# Patient Record
Sex: FEMALE | URBAN - METROPOLITAN AREA
[De-identification: names, ages, dates, MRNs, and addresses within clinical notes are randomized per-mention and may not be internally consistent; named-entity substitution may affect disease eponyms.]

---

## 2024-04-17 ENCOUNTER — OV NP (OUTPATIENT)
Dept: URBAN - METROPOLITAN AREA CLINIC 7 | Facility: CLINIC | Age: 49
End: 2024-04-17
Payer: COMMERCIAL

## 2024-04-17 ENCOUNTER — LAB (OUTPATIENT)
Dept: URBAN - METROPOLITAN AREA CLINIC 7 | Facility: CLINIC | Age: 49
End: 2024-04-17

## 2024-04-17 VITALS
WEIGHT: 194 LBS | SYSTOLIC BLOOD PRESSURE: 124 MMHG | BODY MASS INDEX: 29.4 KG/M2 | HEIGHT: 68 IN | TEMPERATURE: 97.8 F | DIASTOLIC BLOOD PRESSURE: 82 MMHG

## 2024-04-17 DIAGNOSIS — K21.9 CHRONIC GERD: ICD-10-CM

## 2024-04-17 DIAGNOSIS — D50.0 IRON DEFICIENCY ANEMIA DUE TO CHRONIC BLOOD LOSS: ICD-10-CM

## 2024-04-17 DIAGNOSIS — M79.7 FIBROMYALGIA: ICD-10-CM

## 2024-04-17 DIAGNOSIS — R10.84 GENERALIZED ABDOMINAL PAIN: ICD-10-CM

## 2024-04-17 DIAGNOSIS — K76.0 HEPATIC STEATOSIS: ICD-10-CM

## 2024-04-17 DIAGNOSIS — R14.0 BLOATING: ICD-10-CM

## 2024-04-17 DIAGNOSIS — Z80.0 FAMILY HX OF COLON CANCER: ICD-10-CM

## 2024-04-17 DIAGNOSIS — K92.1 MELENA: ICD-10-CM

## 2024-04-17 DIAGNOSIS — K52.9 CHRONIC DIARRHEA: ICD-10-CM

## 2024-04-17 DIAGNOSIS — R68.81 EARLY SATIETY: ICD-10-CM

## 2024-04-17 PROBLEM — 235595009: Status: ACTIVE | Noted: 2024-04-17

## 2024-04-17 PROBLEM — 236071009: Status: ACTIVE | Noted: 2024-04-17

## 2024-04-17 PROBLEM — 2901004: Status: ACTIVE | Noted: 2024-04-17

## 2024-04-17 PROBLEM — 197321007: Status: ACTIVE | Noted: 2024-04-17

## 2024-04-17 PROBLEM — 203082005: Status: ACTIVE | Noted: 2024-04-17

## 2024-04-17 PROBLEM — 724556004: Status: ACTIVE | Noted: 2024-04-17

## 2024-04-17 PROBLEM — 102614006: Status: ACTIVE | Noted: 2024-04-17

## 2024-04-17 PROBLEM — 312824007: Status: ACTIVE | Noted: 2024-04-17

## 2024-04-17 PROBLEM — 116289008: Status: ACTIVE | Noted: 2024-04-17

## 2024-04-17 PROCEDURE — 99204 OFFICE O/P NEW MOD 45 MIN: CPT | Performed by: INTERNAL MEDICINE

## 2024-04-17 RX ORDER — CYCLOSPORINE 0.5 MG/ML
EMULSION OPHTHALMIC
Qty: 60 EACH | Status: ACTIVE | COMMUNITY

## 2024-04-17 NOTE — HPI-TODAY'S VISIT:
[facility] 2018 noted onset of gi sxs for which had negative gi eval . Since that time has had ongoing and recurrent GERD sxs with associated dyspepsia ,bloating and early satiety. Sxs worse postprandially ,worse with bending forward and in the supine position. Having recurrent erutucation as well as acid brash sxs. Also has a change in bowel habits with diarrhea that is recurrent. Symptoms are chronic BMs are gen watery and has had isolated mucoid stools but no recent bleeding. In 2022 had labs notable for iron defic anemia.Took po iron before but had difficulty tolerating . No overt bleeding now but did have melena sxs previously . No fevers or chills. No night sweats. No weight loss. No flushing. Intermittent  nausea  but no vomiting. No travel,sick contacts or new medications. No recent dietary changes. No excessive caffeine intake . No excessive artificial sweetener use including sorbitol and diabetic sweetened foods. No family history IBD Family history CRC in mother. Hx chronic back pain. PRN nsaid use but not regularly.

## 2024-05-01 ENCOUNTER — LAB OUTSIDE AN ENCOUNTER (OUTPATIENT)
Dept: URBAN - METROPOLITAN AREA CLINIC 7 | Facility: CLINIC | Age: 49
End: 2024-05-01

## 2024-05-06 ENCOUNTER — TELEPHONE ENCOUNTER (OUTPATIENT)
Dept: URBAN - METROPOLITAN AREA CLINIC 7 | Facility: CLINIC | Age: 49
End: 2024-05-06

## 2024-05-06 ENCOUNTER — LAB OUTSIDE AN ENCOUNTER (OUTPATIENT)
Dept: URBAN - METROPOLITAN AREA CLINIC 7 | Facility: CLINIC | Age: 49
End: 2024-05-06

## 2024-05-16 ENCOUNTER — TELEPHONE ENCOUNTER (OUTPATIENT)
Dept: URBAN - METROPOLITAN AREA CLINIC 7 | Facility: CLINIC | Age: 49
End: 2024-05-16

## 2024-05-16 ENCOUNTER — LAB OUTSIDE AN ENCOUNTER (OUTPATIENT)
Dept: URBAN - METROPOLITAN AREA CLINIC 7 | Facility: CLINIC | Age: 49
End: 2024-05-16

## 2024-05-30 ENCOUNTER — TELEPHONE ENCOUNTER (OUTPATIENT)
Dept: URBAN - METROPOLITAN AREA CLINIC 7 | Facility: CLINIC | Age: 49
End: 2024-05-30

## 2024-06-11 ENCOUNTER — TELEPHONE ENCOUNTER (OUTPATIENT)
Dept: URBAN - METROPOLITAN AREA CLINIC 7 | Facility: CLINIC | Age: 49
End: 2024-06-11

## 2024-06-18 ENCOUNTER — CLAIMS CREATED FROM THE CLAIM WINDOW (OUTPATIENT)
Dept: URBAN - METROPOLITAN AREA SURGERY CENTER 5 | Facility: SURGERY CENTER | Age: 49
End: 2024-06-18
Payer: COMMERCIAL

## 2024-06-18 ENCOUNTER — CLAIMS CREATED FROM THE CLAIM WINDOW (OUTPATIENT)
Dept: URBAN - METROPOLITAN AREA CLINIC 4 | Facility: CLINIC | Age: 49
End: 2024-06-18
Payer: COMMERCIAL

## 2024-06-18 DIAGNOSIS — K31.89 OTHER DISEASES OF STOMACH AND DUODENUM: ICD-10-CM

## 2024-06-18 DIAGNOSIS — K44.9 DIAPHRAGMATIC HERNIA WITHOUT OBSTRUCTION OR GANGRENE: ICD-10-CM

## 2024-06-18 DIAGNOSIS — K44.9 HIATAL HERNIA: ICD-10-CM

## 2024-06-18 DIAGNOSIS — D50.9 IRON DEFICIENCY ANEMIA, UNSPECIFIED IRON DEFICIENCY ANEMIA TYPE: ICD-10-CM

## 2024-06-18 DIAGNOSIS — D50.9 IRON DEFICIENCY ANEMIA: ICD-10-CM

## 2024-06-18 DIAGNOSIS — R10.13 DYSPEPSIA: ICD-10-CM

## 2024-06-18 DIAGNOSIS — K21.9 GASTRO-ESOPHAGEAL REFLUX DISEASE WITHOUT ESOPHAGITIS: ICD-10-CM

## 2024-06-18 PROCEDURE — 88312 SPECIAL STAINS GROUP 1: CPT | Performed by: PATHOLOGY

## 2024-06-18 PROCEDURE — 00731 ANES UPR GI NDSC PX NOS: CPT | Performed by: INTERNAL MEDICINE

## 2024-06-18 PROCEDURE — 88305 TISSUE EXAM BY PATHOLOGIST: CPT | Performed by: PATHOLOGY

## 2024-06-18 PROCEDURE — 43239 EGD BIOPSY SINGLE/MULTIPLE: CPT | Performed by: INTERNAL MEDICINE

## 2024-06-18 RX ORDER — CYCLOSPORINE 0.5 MG/ML
EMULSION OPHTHALMIC
Qty: 60 EACH | Status: ACTIVE | COMMUNITY

## 2024-06-19 ENCOUNTER — TELEPHONE ENCOUNTER (OUTPATIENT)
Dept: URBAN - METROPOLITAN AREA CLINIC 7 | Facility: CLINIC | Age: 49
End: 2024-06-19

## 2024-06-19 RX ORDER — SODIUM PICOSULFATE, MAGNESIUM OXIDE, AND ANHYDROUS CITRIC ACID 12; 3.5; 1 G/175ML; G/175ML; MG/175ML
175 ML THE FIRST DOSE THE EVENING BEFORE AND SECOND DOSE THE MORNING OF COLONOSCOPY LIQUID ORAL ONCE A DAY
Qty: 350 | Refills: 0 | OUTPATIENT
Start: 2024-06-19 | End: 2024-06-21

## 2024-07-10 ENCOUNTER — TELEPHONE ENCOUNTER (OUTPATIENT)
Dept: URBAN - METROPOLITAN AREA CLINIC 7 | Facility: CLINIC | Age: 49
End: 2024-07-10

## 2024-07-12 ENCOUNTER — CLAIMS CREATED FROM THE CLAIM WINDOW (OUTPATIENT)
Dept: URBAN - METROPOLITAN AREA CLINIC 4 | Facility: CLINIC | Age: 49
End: 2024-07-12
Payer: COMMERCIAL

## 2024-07-12 ENCOUNTER — OFFICE VISIT (OUTPATIENT)
Dept: URBAN - METROPOLITAN AREA SURGERY CENTER 5 | Facility: SURGERY CENTER | Age: 49
End: 2024-07-12
Payer: COMMERCIAL

## 2024-07-12 ENCOUNTER — TELEPHONE ENCOUNTER (OUTPATIENT)
Dept: URBAN - METROPOLITAN AREA SURGERY CENTER 5 | Facility: SURGERY CENTER | Age: 49
End: 2024-07-12

## 2024-07-12 DIAGNOSIS — K63.89 OTHER SPECIFIED DISEASES OF INTESTINE: ICD-10-CM

## 2024-07-12 DIAGNOSIS — R19.7 DIARRHEA, UNSPECIFIED TYPE: ICD-10-CM

## 2024-07-12 PROCEDURE — 88313 SPECIAL STAINS GROUP 2: CPT | Performed by: PATHOLOGY

## 2024-07-12 PROCEDURE — 88305 TISSUE EXAM BY PATHOLOGIST: CPT | Performed by: PATHOLOGY

## 2024-07-12 PROCEDURE — 00811 ANES LWR INTST NDSC NOS: CPT | Performed by: NURSE ANESTHETIST, CERTIFIED REGISTERED

## 2024-07-12 PROCEDURE — 88342 IMHCHEM/IMCYTCHM 1ST ANTB: CPT | Performed by: PATHOLOGY

## 2024-07-12 PROCEDURE — 45380 COLONOSCOPY AND BIOPSY: CPT | Performed by: INTERNAL MEDICINE

## 2024-07-12 RX ORDER — CYCLOSPORINE 0.5 MG/ML
EMULSION OPHTHALMIC
Qty: 60 EACH | Status: ACTIVE | COMMUNITY

## 2024-11-19 ENCOUNTER — DASHBOARD ENCOUNTERS (OUTPATIENT)
Age: 49
End: 2024-11-19

## 2024-11-19 ENCOUNTER — OFFICE VISIT (OUTPATIENT)
Dept: URBAN - METROPOLITAN AREA CLINIC 7 | Facility: CLINIC | Age: 49
End: 2024-11-19
Payer: COMMERCIAL

## 2024-11-19 VITALS
BODY MASS INDEX: 27.89 KG/M2 | HEIGHT: 68 IN | DIASTOLIC BLOOD PRESSURE: 90 MMHG | OXYGEN SATURATION: 99 % | SYSTOLIC BLOOD PRESSURE: 130 MMHG | WEIGHT: 184 LBS | HEART RATE: 80 BPM | TEMPERATURE: 97.6 F

## 2024-11-19 DIAGNOSIS — Z80.0 FAMILY HX OF COLON CANCER: ICD-10-CM

## 2024-11-19 DIAGNOSIS — K21.9 CHRONIC GERD: ICD-10-CM

## 2024-11-19 DIAGNOSIS — K76.0 HEPATIC STEATOSIS: ICD-10-CM

## 2024-11-19 DIAGNOSIS — M79.7 FIBROMYALGIA: ICD-10-CM

## 2024-11-19 PROCEDURE — 99213 OFFICE O/P EST LOW 20 MIN: CPT | Performed by: INTERNAL MEDICINE

## 2024-11-19 RX ORDER — CYCLOSPORINE 0.5 MG/ML
EMULSION OPHTHALMIC
Qty: 60 EACH | Status: ON HOLD | COMMUNITY

## 2024-11-19 NOTE — HPI-TODAY'S VISIT:
Seen now s/p fibroscan [id number] Has modified diet and lost 15lb since last ov . Did trial probiotic and noted that this causes diarrhea.Has now dc probiotic rx. Last seen with hx that [facility] 2018 noted onset of gi sxs for which had negative gi eval . Since that time has had ongoing and recurrent GERD sxs with associated dyspepsia ,bloating and early satiety. Sxs worse postprandially ,worse with bending forward and in the supine position. Having recurrent erutucation as well as acid brash sxs. Also has a change in bowel habits with diarrhea that is recurrent. Symptoms are chronic BMs are gen watery and has had isolated mucoid stools but no recent bleeding. In 2022 had labs notable for iron defic anemia.Took po iron before but had difficulty tolerating . No overt bleeding now but did have melena sxs previously . No fevers or chills. No night sweats. No weight loss. No flushing. Intermittent  nausea  but no vomiting. No travel,sick contacts or new medications. No recent dietary changes. No excessive caffeine intake . No excessive artificial sweetener use including sorbitol and diabetic sweetened foods. No family history IBD Family history CRC in mother. Hx chronic back pain. PRN nsaid use but not regularly.